# Patient Record
Sex: FEMALE | Race: WHITE | URBAN - METROPOLITAN AREA
[De-identification: names, ages, dates, MRNs, and addresses within clinical notes are randomized per-mention and may not be internally consistent; named-entity substitution may affect disease eponyms.]

---
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
## 2020-06-03 ENCOUNTER — TELEPHONE (OUTPATIENT)
Dept: FAMILY MEDICINE CLINIC | Facility: CLINIC | Age: 20
End: 2020-06-03

## 2020-06-03 RX ORDER — ETONOGESTREL AND ETHINYL ESTRADIOL VAGINAL .015; .12 MG/D; MG/D
RING VAGINAL
Qty: 3 EACH | Refills: 1 | OUTPATIENT
Start: 2020-06-03

## 2020-06-03 NOTE — TELEPHONE ENCOUNTER
Patient called and advised she was seeing a Psychologist, however her student insurance is no longer cover the visits with the psychologist. She was looking for a referral to a new psychologist.       Medication Quantity Refills Start End   FLUoxetine HCl

## 2020-06-03 NOTE — TELEPHONE ENCOUNTER
Patient called and asked if Dr. Eloina Goyal would refill this medication for her until she finds a new psychologist that is covered under her new insurance? Patient advised she is completely out of her last refill       Please Advise.        Please Use Pharm[id number]    Medication Needed:  Medication Quantity Refills Start End   FLUoxetine HCl 40 MG Oral Cap

## 2020-06-04 NOTE — TELEPHONE ENCOUNTER
Left message, please inform patient of Dr. Mia Funk message,    [address], PsyD  [facility], [address] [place] [address] Trini Mendoza  [phone]

## 2020-06-08 RX ORDER — ETONOGESTREL AND ETHINYL ESTRADIOL VAGINAL .015; .12 MG/D; MG/D
RING VAGINAL
Refills: 0 | OUTPATIENT
Start: 2020-06-08

## 2020-06-09 NOTE — TELEPHONE ENCOUNTER
From  Kae Rodriguez To  Azeem Stanley and Delivered  6/5/2020  9:36 AM   Last Read in MyChart  6/5/2020 12:37 PM by Finesse Marie

## 2020-07-08 ENCOUNTER — NURSE ONLY (OUTPATIENT)
Dept: FAMILY MEDICINE CLINIC | Facility: CLINIC | Age: 20
End: 2020-07-08
Payer: COMMERCIAL

## 2020-07-08 ENCOUNTER — OFFICE VISIT (OUTPATIENT)
Dept: FAMILY MEDICINE CLINIC | Facility: CLINIC | Age: 20
End: 2020-07-08
Payer: COMMERCIAL

## 2020-07-08 VITALS
WEIGHT: 126 LBS | DIASTOLIC BLOOD PRESSURE: 72 MMHG | TEMPERATURE: 99 F | SYSTOLIC BLOOD PRESSURE: 102 MMHG | BODY MASS INDEX: 22.61 KG/M2 | HEART RATE: 78 BPM | HEIGHT: 62.5 IN

## 2020-07-08 DIAGNOSIS — Z00.00 ANNUAL PHYSICAL EXAM: Primary | ICD-10-CM

## 2020-07-08 DIAGNOSIS — F32.A ANXIETY AND DEPRESSION: ICD-10-CM

## 2020-07-08 DIAGNOSIS — F41.9 ANXIETY AND DEPRESSION: ICD-10-CM

## 2020-07-08 PROCEDURE — 99395 PREV VISIT EST AGE 18-39: CPT | Performed by: FAMILY MEDICINE

## 2020-07-08 RX ORDER — ETONOGESTREL AND ETHINYL ESTRADIOL 11.7; 2.7 MG/1; MG/1
INSERT, EXTENDED RELEASE VAGINAL
Qty: 3 EACH | Refills: 3 | Status: SHIPPED | OUTPATIENT
Start: 2020-07-08 | End: 2021-01-05

## 2020-07-08 RX ORDER — FLUOXETINE HYDROCHLORIDE 40 MG/1
40 CAPSULE ORAL DAILY
Qty: 90 CAPSULE | Refills: 1 | Status: SHIPPED | OUTPATIENT
Start: 2020-07-08 | End: 2021-01-21

## 2020-07-08 RX ORDER — ESCITALOPRAM OXALATE 10 MG/1
10 TABLET ORAL DAILY
Qty: 90 TABLET | Refills: 1 | Status: SHIPPED | OUTPATIENT
Start: 2020-07-08 | End: 2021-06-15

## 2020-07-08 NOTE — PROGRESS NOTES
HPI:    Trent Rodriguez is a 21year old female presents to clinic for annual physical exam.  No acute concerns. Depression/anxiety-chronic issue for patient. Takes Prozac and Lexapro. Ran out 1 month back, reports feeling an increase in symptoms.   D are normal.   Eyes: Pupils are equal, round, and reactive to light. Conjunctivae and EOM are normal.   Neck: Normal range of motion. Neck supple. No thyromegaly present. Cardiovascular: Normal rate, regular rhythm and normal heart sounds.    No murmur hea content may be related to improper recognition by the system; efforts to review and correct have been done but errors may still exist. Please contact me with any questions.        7/8/2020  Stevan Flores MD

## 2020-07-09 LAB
CHLAMYDIA TRACHOMATIS$RNA, TMA: NOT DETECTED
NEISSERIA GONORRHOEAE$RNA, TMA: NOT DETECTED

## 2020-08-04 ENCOUNTER — TELEPHONE (OUTPATIENT)
Dept: FAMILY MEDICINE CLINIC | Facility: CLINIC | Age: 20
End: 2020-08-04

## 2020-08-04 NOTE — TELEPHONE ENCOUNTER
Patient requesting a letter excusing her from living on campus due to covid. States she does not want to risk any chances.

## 2020-09-27 ENCOUNTER — PATIENT MESSAGE (OUTPATIENT)
Dept: FAMILY MEDICINE CLINIC | Facility: CLINIC | Age: 20
End: 2020-09-27

## 2020-09-27 ENCOUNTER — TELEPHONE (OUTPATIENT)
Dept: FAMILY MEDICINE CLINIC | Facility: CLINIC | Age: 20
End: 2020-09-27

## 2020-09-28 NOTE — TELEPHONE ENCOUNTER
From: Sunshine Sandoval  To: Mounika Up MD  Sent: 9/27/2020 5:21 PM CDT  Subject: Prescription Question    Dear Dr. Jackelyn Salomon,  Is it possible to increase my dosage of Prozac?  Given everything going on in the world, I have been experiencing higher depres

## 2020-09-28 NOTE — TELEPHONE ENCOUNTER
From: Otilio Meigs Cozette  To: Dio Ramirez MD  Sent: 9/27/2020  5:21 PM CDT  Subject: Prescription Question    Dear Dr. Dionisio Dewitt,  Is it possible to increase my dosage of Prozac?  Given everything going on in the world, I have been experiencing higher depre

## 2020-09-30 NOTE — TELEPHONE ENCOUNTER
Left message to call back 3rd attempt, she has not read her my chart message. Sending a no response letter.

## 2021-01-05 RX ORDER — ETONOGESTREL AND ETHINYL ESTRADIOL 11.7; 2.7 MG/1; MG/1
INSERT, EXTENDED RELEASE VAGINAL
Qty: 3 EACH | Refills: 3 | Status: SHIPPED | OUTPATIENT
Start: 2021-01-05 | End: 2021-05-28

## 2021-01-21 RX ORDER — FLUOXETINE HYDROCHLORIDE 40 MG/1
CAPSULE ORAL
Qty: 90 CAPSULE | Refills: 1 | Status: SHIPPED | OUTPATIENT
Start: 2021-01-21 | End: 2021-08-06

## 2021-05-29 RX ORDER — ETONOGESTREL AND ETHINYL ESTRADIOL VAGINAL .015; .12 MG/D; MG/D
RING VAGINAL
Qty: 3 EACH | Refills: 3 | Status: SHIPPED | OUTPATIENT
Start: 2021-05-29 | End: 2021-08-06

## 2021-06-15 ENCOUNTER — OFFICE VISIT (OUTPATIENT)
Dept: FAMILY MEDICINE CLINIC | Facility: CLINIC | Age: 21
End: 2021-06-15
Payer: COMMERCIAL

## 2021-06-15 ENCOUNTER — LAB ENCOUNTER (OUTPATIENT)
Dept: LAB | Age: 21
End: 2021-06-15
Attending: FAMILY MEDICINE

## 2021-06-15 VITALS
TEMPERATURE: 98 F | HEART RATE: 83 BPM | BODY MASS INDEX: 23.73 KG/M2 | SYSTOLIC BLOOD PRESSURE: 93 MMHG | DIASTOLIC BLOOD PRESSURE: 64 MMHG | WEIGHT: 132.25 LBS | HEIGHT: 62.5 IN

## 2021-06-15 DIAGNOSIS — Z01.419 WELL WOMAN EXAM WITH ROUTINE GYNECOLOGICAL EXAM: Primary | ICD-10-CM

## 2021-06-15 DIAGNOSIS — F41.9 ANXIETY AND DEPRESSION: ICD-10-CM

## 2021-06-15 DIAGNOSIS — F32.A ANXIETY AND DEPRESSION: ICD-10-CM

## 2021-06-15 DIAGNOSIS — Z01.419 WELL WOMAN EXAM WITH ROUTINE GYNECOLOGICAL EXAM: ICD-10-CM

## 2021-06-15 PROCEDURE — 3078F DIAST BP <80 MM HG: CPT | Performed by: FAMILY MEDICINE

## 2021-06-15 PROCEDURE — 90471 IMMUNIZATION ADMIN: CPT | Performed by: FAMILY MEDICINE

## 2021-06-15 PROCEDURE — 36415 COLL VENOUS BLD VENIPUNCTURE: CPT

## 2021-06-15 PROCEDURE — 87389 HIV-1 AG W/HIV-1&-2 AB AG IA: CPT

## 2021-06-15 PROCEDURE — 90715 TDAP VACCINE 7 YRS/> IM: CPT | Performed by: FAMILY MEDICINE

## 2021-06-15 PROCEDURE — 99395 PREV VISIT EST AGE 18-39: CPT | Performed by: FAMILY MEDICINE

## 2021-06-15 PROCEDURE — 86780 TREPONEMA PALLIDUM: CPT

## 2021-06-15 PROCEDURE — 3008F BODY MASS INDEX DOCD: CPT | Performed by: FAMILY MEDICINE

## 2021-06-15 PROCEDURE — 3074F SYST BP LT 130 MM HG: CPT | Performed by: FAMILY MEDICINE

## 2021-06-15 RX ORDER — ALPRAZOLAM 1 MG/1
1 TABLET ORAL
COMMUNITY

## 2021-06-15 RX ORDER — MINOCYCLINE HYDROCHLORIDE 100 MG/1
CAPSULE ORAL
COMMUNITY
Start: 2021-06-01

## 2021-06-15 RX ORDER — SPIRONOLACTONE 50 MG/1
TABLET, FILM COATED ORAL
COMMUNITY
Start: 2021-06-01

## 2021-06-15 NOTE — PROGRESS NOTES
HPI:    Elias Boone is a 24year old female presents to clinic for annual physical exam.  No acute concerns. Recently started minocycline and spironolactone for acne, feels it is helping. Normal appetite, tries eat healthy foods.   Normal bowel mov light.   Neck:      Thyroid: No thyromegaly. Cardiovascular:      Rate and Rhythm: Normal rate and regular rhythm. Heart sounds: Normal heart sounds. No murmur heard. Pulmonary:      Effort: Pulmonary effort is normal. No respiratory distress. YEARS, IM USE      ThinPrep PAP with HPV Reflex Request [E]      Chlamydia/Gc Amplification [E]      Meds This Visit:  Requested Prescriptions      No prescriptions requested or ordered in this encounter       Imaging & Referrals:  TETANUS, DIPHTHERIA TOXO

## 2021-06-16 LAB — T PALLIDUM AB SER QL: NEGATIVE

## 2021-08-06 RX ORDER — FLUOXETINE HYDROCHLORIDE 40 MG/1
40 CAPSULE ORAL DAILY
Qty: 90 CAPSULE | Refills: 1 | Status: SHIPPED | OUTPATIENT
Start: 2021-08-06 | End: 2021-12-12

## 2021-08-06 RX ORDER — ETONOGESTREL AND ETHINYL ESTRADIOL VAGINAL .015; .12 MG/D; MG/D
RING VAGINAL
Qty: 3 EACH | Refills: 1 | Status: SHIPPED | OUTPATIENT
Start: 2021-08-06 | End: 2022-03-01

## 2021-08-07 NOTE — TELEPHONE ENCOUNTER
Last Pap Smear was 6/15/21. Refill passed per [facility] protocol. Requested Prescriptions   Pending Prescriptions Disp Refills    FLUoxetine HCl 40 MG Oral Cap 90 capsule 1     Sig: Take 1 capsule (40 mg total) by mouth daily.         Psychiatric Non-Scheduled (Anti-Anxiety) Passed - 8/6/2021 10:59 PM        Passed - Appointment in last 6 or next 3 months           Etonogestrel-Ethinyl Estradiol 0.12-0.015 MG/24HR Vaginal Ring 3 each 3     Sig: Use as directed        Gynecology Medication Protocol Failed - 8/6/2021 10:59 PM        Failed - Pass dependent on manual look-up of last PAP and patient compliance with PAP follow up recommendations        Passed - Appointment in past 12 or next 3 months                    Recent Outpatient Visits              1 month ago Well woman exam with routine gynecological exam    Krystal Arteaga MD    Office Visit    1 year ago     [facility], [address]    Nurse Only    1 year ago Annual physical exam    Krystal Arteaga MD    Office Visit    2 years ago Encounter for contraceptive management, unspecified type    Krystal Arteaga MD    Office Visit    3 years ago Encounter for general counseling and advice on contraceptive [employer], Charles Stubbs MD    Office Visit

## 2021-08-11 ENCOUNTER — NURSE TRIAGE (OUTPATIENT)
Dept: FAMILY MEDICINE CLINIC | Facility: CLINIC | Age: 21
End: 2021-08-11

## 2021-08-11 NOTE — TELEPHONE ENCOUNTER
Action Requested: Summary for Provider     []  Critical Lab, Recommendations Needed  [] Need Additional Advice  [x]   FYI    []   Need Orders  [] Need Medications Sent to Pharmacy  []  Other     SUMMARY: Per protocol: OV. OPO is full.  Offered other offices

## 2021-08-11 NOTE — TELEPHONE ENCOUNTER
----- Message from [facility] sent at 8/11/2021 11:02 AM CDT -----  Regarding: Non-Urgent Medical Question  Contact: [phone]  I think my birth control (NuvaRing) may have started giving me mild but consistent yeast infections.  I'm not sure if th

## 2021-12-12 RX ORDER — FLUOXETINE HYDROCHLORIDE 40 MG/1
CAPSULE ORAL
Qty: 90 CAPSULE | Refills: 1 | Status: SHIPPED | OUTPATIENT
Start: 2021-12-12

## 2021-12-22 ENCOUNTER — LAB ENCOUNTER (OUTPATIENT)
Dept: LAB | Age: 21
End: 2021-12-22
Attending: FAMILY MEDICINE
Payer: COMMERCIAL

## 2021-12-22 ENCOUNTER — OFFICE VISIT (OUTPATIENT)
Dept: FAMILY MEDICINE CLINIC | Facility: CLINIC | Age: 21
End: 2021-12-22
Payer: COMMERCIAL

## 2021-12-22 VITALS
TEMPERATURE: 97 F | HEIGHT: 63 IN | OXYGEN SATURATION: 98 % | BODY MASS INDEX: 21.79 KG/M2 | DIASTOLIC BLOOD PRESSURE: 68 MMHG | WEIGHT: 123 LBS | SYSTOLIC BLOOD PRESSURE: 102 MMHG | RESPIRATION RATE: 18 BRPM | HEART RATE: 85 BPM

## 2021-12-22 DIAGNOSIS — F32.1 CURRENT MODERATE EPISODE OF MAJOR DEPRESSIVE DISORDER, UNSPECIFIED WHETHER RECURRENT (HCC): ICD-10-CM

## 2021-12-22 DIAGNOSIS — R63.0 ANOREXIA: Primary | ICD-10-CM

## 2021-12-22 PROCEDURE — 3074F SYST BP LT 130 MM HG: CPT | Performed by: FAMILY MEDICINE

## 2021-12-22 PROCEDURE — 99214 OFFICE O/P EST MOD 30 MIN: CPT | Performed by: FAMILY MEDICINE

## 2021-12-22 PROCEDURE — 3008F BODY MASS INDEX DOCD: CPT | Performed by: FAMILY MEDICINE

## 2021-12-22 PROCEDURE — 3078F DIAST BP <80 MM HG: CPT | Performed by: FAMILY MEDICINE

## 2021-12-22 NOTE — PROGRESS NOTES
HPI:    Mel Torres is a 24year old female with history of major depression disorder anxiety presents for follow-up. Seeing a therapist and a dietitian. Feels she has good/bad days.   Frequently restricts herself from eating, still struggles with disorder, unspecified whether recurrent ([facility].)  Plan: CBC WITH DIFFERENTIAL WITH PLATELET, TSH W         REFLEX TO FREE T4, IRON AND TIBC, VITAMIN D,         25-HYDROXY, VITAMIN B12  -CBC, vitamin D/B12, iron, TSH levels drawn.   Patient will sign release so

## 2021-12-29 ENCOUNTER — TELEPHONE (OUTPATIENT)
Dept: FAMILY MEDICINE CLINIC | Facility: CLINIC | Age: 21
End: 2021-12-29

## 2021-12-29 NOTE — TELEPHONE ENCOUNTER
Patient's dietician- Char Campo is calling to request results from labs taken on 12/22 and patient's updated weight. Char Campo is requesting either a call or a fax with the information before Monday.   Fax: [phone]

## 2022-03-01 ENCOUNTER — PATIENT MESSAGE (OUTPATIENT)
Dept: FAMILY MEDICINE CLINIC | Facility: CLINIC | Age: 22
End: 2022-03-01

## 2022-03-01 NOTE — TELEPHONE ENCOUNTER
From: Skinny Sandoval  To: Zac Saldivar MD  Sent: 3/1/2022 2:05 PM CST  Subject: Upcoming Visit    I forgot to mention I would also like to get my Xanax refilled, it has been a while since I have been able to refill and I have needed it.

## 2022-03-02 RX ORDER — ETONOGESTREL AND ETHINYL ESTRADIOL 11.7; 2.7 MG/1; MG/1
INSERT, EXTENDED RELEASE VAGINAL
Qty: 3 EACH | Refills: 1 | Status: SHIPPED | OUTPATIENT
Start: 2022-03-02

## 2022-03-02 RX ORDER — ETONOGESTREL AND ETHINYL ESTRADIOL 11.7; 2.7 MG/1; MG/1
INSERT, EXTENDED RELEASE VAGINAL
Qty: 3 EACH | Refills: 1 | Status: SHIPPED | OUTPATIENT
Start: 2022-03-02 | End: 2022-03-02

## 2022-03-02 RX ORDER — ALPRAZOLAM 1 MG/1
1 TABLET ORAL NIGHTLY PRN
Qty: 90 TABLET | Refills: 0 | Status: SHIPPED | OUTPATIENT
Start: 2022-03-02 | End: 2022-03-15

## 2022-03-03 NOTE — TELEPHONE ENCOUNTER
Refill passed per [facility] protocol. Requested Prescriptions   Pending Prescriptions Disp Refills    Etonogestrel-Ethinyl Estradiol 0.12-0.015 MG/24HR Vaginal Ring 3 each 1     Sig: Use as directed        Gynecology Medication Protocol Failed - 3/1/2022  2:04 PM        Failed - Pass dependent on manual look-up of last PAP and patient compliance with PAP follow up recommendations        Passed - Appointment in past 12 or next 3 months             Recent Outpatient Visits              2 months ago [address] [place] Clyda Albe, MD    Office Visit    8 months ago Well woman exam with routine gynecological exam    [place] [employer], Sheryle [place], [place]    Office Visit    1 year ago     [place] [facility] [place]    Nurse Only    1 year ago Annual physical exam    Roger De La Rosa MD    Office Visit    2 years ago Encounter for contraceptive management, unspecified type    [place] [facility] [place] Clyda Albe[facility] [place]    Office Visit          Future Appointments         Provider Department Appt Notes    In 1 week Terri Shell MD [place] [facility] [place] I am looking to increase my dosage of Prozac.

## 2022-03-15 PROBLEM — F50.9 EATING DISORDER, UNSPECIFIED: Status: ACTIVE | Noted: 2022-03-15

## 2022-03-15 PROBLEM — F33.9 MAJOR DEPRESSION, RECURRENT (HCC): Status: ACTIVE | Noted: 2022-03-15

## 2022-03-15 PROBLEM — F41.1 GAD (GENERALIZED ANXIETY DISORDER): Status: ACTIVE | Noted: 2022-03-15

## 2022-06-27 ENCOUNTER — TELEPHONE (OUTPATIENT)
Dept: FAMILY MEDICINE CLINIC | Facility: CLINIC | Age: 22
End: 2022-06-27

## 2022-06-27 RX ORDER — ETONOGESTREL AND ETHINYL ESTRADIOL 11.7; 2.7 MG/1; MG/1
1 INSERT, EXTENDED RELEASE VAGINAL
Qty: 3 EACH | Refills: 0 | Status: SHIPPED | OUTPATIENT
Start: 2022-06-27

## 2022-06-27 NOTE — TELEPHONE ENCOUNTER
Per pharmacy they need to clarify the direction on Nuvering that they received Please call [phone].

## 2022-08-15 ENCOUNTER — TELEPHONE (OUTPATIENT)
Dept: FAMILY MEDICINE CLINIC | Facility: CLINIC | Age: 22
End: 2022-08-15

## 2022-08-15 NOTE — TELEPHONE ENCOUNTER
RECORDS RECEIVED FROM: internal    DATE RECEIVED: 12.14.21    NOTES (FOR ALL VISITS) STATUS DETAILS   OFFICE NOTES from referring provider internal  Dr. Haskins   OFFICE NOTES from other specialist na    ED NOTES na    OPERATIVE REPORT  (thyroid, pituitary, adrenal, parathyroid) na    MEDICATION LIST internal     IMAGING      DEXASCAN na    MRI (BRAIN) na    XR (Chest) na    CT (HEAD/NECK/CHEST/ABDOMEN) na    NUCLEAR  na    ULTRASOUND (HEAD/NECK) na    LABS     DIABETES: HBGA1C, CREATININE, FASTING LIPIDS, MICROALBUMIN URINE, POTASSIUM, TSH, T4    THYROID: TSH, T4, CBC, THYRODLONULIN, TOTAL T3, FREE T4, CALCITONIN, CEA internal  T3- 10.26.21   TSH/T4- 10.26.21   HBGA1C- 10.26.21   Cbc- 10.26.21  Creatinine- 5.4.21               Per José Miguel, Fluoxetine 60mg Tablets is not covered under patient's insurance. Per Rx benefit plan, alternative medications include Fluoxetine 20mg and 40mg Capsules to equal 60mg dose. Please fax back with approval along with strength, directions, quantity and refills.

## 2022-08-15 NOTE — TELEPHONE ENCOUNTER
Called patient's pharmacy, confirmed name and . Spoke to [place] PharmD and confirmed script can be filled as 20mg with 40mg to achieve prescribed dose.

## 2022-08-23 RX ORDER — FLUOXETINE HYDROCHLORIDE 40 MG/1
40 CAPSULE ORAL DAILY
Qty: 90 CAPSULE | Refills: 1 | Status: SHIPPED | OUTPATIENT
Start: 2022-08-23

## 2022-08-23 NOTE — TELEPHONE ENCOUNTER
Refill passed per [employer] [facility] protocol. Requested Prescriptions   Pending Prescriptions Disp Refills    FLUoxetine HCl 40 MG Oral Cap 90 capsule 1     Sig: Take 1 capsule (40 mg total) by mouth daily.  Take with 20mg to total 60mg        Psychiatric Non-Scheduled (Anti-Anxiety) Passed - 8/23/2022  3:00 PM        Passed - In person appointment or virtual visit in the past 6 mos or appointment in next 3 mos       Recent Outpatient Visits              5 months ago Moderate episode of recurrent major depressive disorder [facility])    [place] [employer] [facility], [address] MD    Office Visit    [facility]    Office Visit    1 year ago Well woman exam with routine gynecological exam    [place] [facility], Earl Truong MD    Office Visit    2 years ago     [place] [facility], [address]    Nurse Only    2 years ago Annual physical exam    [facility], [facility], [place], Cherylyn Pauls, MD    Office Visit     Future Appointments         Provider Department Appt Notes    In 1 month Malia Flanagan, [employer] [employer] [facility], [address] 6/15/21                      Recent Outpatient Visits              5 months ago Moderate episode of recurrent major depressive disorder [facility])    Irineo Go MD    Office Visit    8 months ago Denita Oliveira MD    Office Visit    1 year ago Well woman exam with routine gynecological exam    Irineo Go MD    Office Visit    2 years ago     [place] [employer] [facility], [address]    Nurse Only    2 years ago Annual physical exam    [place] [employer] [facility], [place] Truong MD    Office Visit             Future Appointments         Provider Department Appt Notes    In 1 month Merryl Masha, [address] [facility], [address], [place] Last [address] 6/15/21

## 2022-10-04 ENCOUNTER — LAB ENCOUNTER (OUTPATIENT)
Dept: LAB | Age: 22
End: 2022-10-04
Attending: FAMILY MEDICINE
Payer: COMMERCIAL

## 2022-10-04 ENCOUNTER — OFFICE VISIT (OUTPATIENT)
Dept: FAMILY MEDICINE CLINIC | Facility: CLINIC | Age: 22
End: 2022-10-04
Payer: COMMERCIAL

## 2022-10-04 VITALS
HEIGHT: 63 IN | SYSTOLIC BLOOD PRESSURE: 100 MMHG | BODY MASS INDEX: 22.68 KG/M2 | HEART RATE: 70 BPM | RESPIRATION RATE: 17 BRPM | OXYGEN SATURATION: 99 % | DIASTOLIC BLOOD PRESSURE: 64 MMHG | WEIGHT: 128 LBS

## 2022-10-04 DIAGNOSIS — Z11.3 SCREENING EXAMINATION FOR STD (SEXUALLY TRANSMITTED DISEASE): ICD-10-CM

## 2022-10-04 DIAGNOSIS — Z00.00 ANNUAL PHYSICAL EXAM: Primary | ICD-10-CM

## 2022-10-04 PROCEDURE — 3074F SYST BP LT 130 MM HG: CPT | Performed by: FAMILY MEDICINE

## 2022-10-04 PROCEDURE — 90471 IMMUNIZATION ADMIN: CPT | Performed by: FAMILY MEDICINE

## 2022-10-04 PROCEDURE — 99395 PREV VISIT EST AGE 18-39: CPT | Performed by: FAMILY MEDICINE

## 2022-10-04 PROCEDURE — 3078F DIAST BP <80 MM HG: CPT | Performed by: FAMILY MEDICINE

## 2022-10-04 PROCEDURE — 90686 IIV4 VACC NO PRSV 0.5 ML IM: CPT | Performed by: FAMILY MEDICINE

## 2022-10-04 PROCEDURE — 3008F BODY MASS INDEX DOCD: CPT | Performed by: FAMILY MEDICINE

## 2022-10-04 RX ORDER — MINOCYCLINE HYDROCHLORIDE 50 MG/1
TABLET ORAL
COMMUNITY
Start: 2022-10-03

## 2022-10-05 LAB
CHLAMYDIA TRACHOMATIS$RNA, TMA: NOT DETECTED
NEISSERIA GONORRHOEAE$RNA, TMA: NOT DETECTED

## 2022-12-22 ENCOUNTER — OFFICE VISIT (OUTPATIENT)
Dept: FAMILY MEDICINE CLINIC | Facility: CLINIC | Age: 22
End: 2022-12-22
Payer: COMMERCIAL

## 2022-12-22 ENCOUNTER — LAB ENCOUNTER (OUTPATIENT)
Dept: LAB | Age: 22
End: 2022-12-22
Attending: FAMILY MEDICINE
Payer: COMMERCIAL

## 2022-12-22 VITALS
DIASTOLIC BLOOD PRESSURE: 72 MMHG | HEIGHT: 63 IN | WEIGHT: 127 LBS | OXYGEN SATURATION: 98 % | HEART RATE: 70 BPM | SYSTOLIC BLOOD PRESSURE: 108 MMHG | BODY MASS INDEX: 22.5 KG/M2 | RESPIRATION RATE: 19 BRPM

## 2022-12-22 DIAGNOSIS — Z30.09 GENERAL COUNSELING AND ADVICE FOR CONTRACEPTIVE MANAGEMENT: ICD-10-CM

## 2022-12-22 DIAGNOSIS — Z11.3 SCREENING EXAMINATION FOR STD (SEXUALLY TRANSMITTED DISEASE): ICD-10-CM

## 2022-12-22 DIAGNOSIS — N76.0 ACUTE VAGINITIS: Primary | ICD-10-CM

## 2022-12-22 PROCEDURE — 3008F BODY MASS INDEX DOCD: CPT | Performed by: FAMILY MEDICINE

## 2022-12-22 PROCEDURE — 3078F DIAST BP <80 MM HG: CPT | Performed by: FAMILY MEDICINE

## 2022-12-22 PROCEDURE — 99214 OFFICE O/P EST MOD 30 MIN: CPT | Performed by: FAMILY MEDICINE

## 2022-12-22 PROCEDURE — 3074F SYST BP LT 130 MM HG: CPT | Performed by: FAMILY MEDICINE

## 2022-12-23 LAB
CANDIDA GLABRATA: NOT DETECTED
CANDIDA SPECIES: DETECTED
CHLAMYDIA TRACHOMATIS$RNA, TMA: NOT DETECTED
NEISSERIA GONORRHOEAE$RNA, TMA: NOT DETECTED
SURESWAB(R) ADV BACTERIAL VAGINOSIS (BV), TMA: NEGATIVE
TRICHOMONAS VAGINALIS (TV): NOT DETECTED

## 2023-09-22 NOTE — TELEPHONE ENCOUNTER
Routed to [address] for advise, thanks. LOV 10-4-22      Please review refill protocol failed/ no protocol  Requested Prescriptions   Pending Prescriptions Disp Refills    fluconazole 150 MG Oral Tab 2 tablet 0     Sig: To take one tablet and repeat dose in 3 days if no improvement.        There is no refill protocol information for this order

## 2023-09-25 RX ORDER — FLUCONAZOLE 150 MG/1
TABLET ORAL
Qty: 2 TABLET | Refills: 0 | Status: SHIPPED | OUTPATIENT
Start: 2023-09-25

## 2023-10-06 ENCOUNTER — OFFICE VISIT (OUTPATIENT)
Dept: FAMILY MEDICINE CLINIC | Facility: CLINIC | Age: 23
End: 2023-10-06

## 2023-10-06 VITALS
DIASTOLIC BLOOD PRESSURE: 54 MMHG | SYSTOLIC BLOOD PRESSURE: 86 MMHG | BODY MASS INDEX: 21 KG/M2 | TEMPERATURE: 98 F | HEART RATE: 83 BPM | WEIGHT: 117 LBS

## 2023-10-06 DIAGNOSIS — N92.1 BREAKTHROUGH BLEEDING ON BIRTH CONTROL PILLS: Primary | ICD-10-CM

## 2023-10-06 PROCEDURE — 99213 OFFICE O/P EST LOW 20 MIN: CPT | Performed by: FAMILY MEDICINE

## 2023-10-06 PROCEDURE — 3074F SYST BP LT 130 MM HG: CPT | Performed by: FAMILY MEDICINE

## 2023-10-06 PROCEDURE — 90686 IIV4 VACC NO PRSV 0.5 ML IM: CPT | Performed by: FAMILY MEDICINE

## 2023-10-06 PROCEDURE — 3078F DIAST BP <80 MM HG: CPT | Performed by: FAMILY MEDICINE

## 2023-10-06 PROCEDURE — 90471 IMMUNIZATION ADMIN: CPT | Performed by: FAMILY MEDICINE

## 2023-10-06 RX ORDER — FLUCONAZOLE 150 MG/1
150 TABLET ORAL
Qty: 6 TABLET | Refills: 0 | Status: SHIPPED | OUTPATIENT
Start: 2023-10-06 | End: 2023-12-29

## 2023-10-06 RX ORDER — NORGESTIMATE AND ETHINYL ESTRADIOL 0.25-0.035
1 KIT ORAL DAILY
Qty: 84 TABLET | Refills: 3 | Status: SHIPPED | OUTPATIENT
Start: 2023-10-06 | End: 2024-10-05

## 2023-10-06 RX ORDER — LAMOTRIGINE 100 MG/1
100 TABLET ORAL DAILY
COMMUNITY

## 2023-10-06 NOTE — PROGRESS NOTES
Subjective:   Patient ID: Macie Allen is a 21year old female. Menstrual Problem  The patient's primary symptoms include vaginal bleeding. This is a recurrent problem. The current episode started more than 1 month ago. The problem occurs intermittently. The problem has been waxing and waning. She is not pregnant. Pertinent negatives include no dysuria, fever or nausea. History/Other:   Review of Systems   Constitutional:  Negative for fever. Gastrointestinal:  Negative for nausea. Genitourinary:  Positive for menstrual problem. Negative for dysuria. Current Outpatient Medications   Medication Sig Dispense Refill    lamoTRIgine 100 MG Oral Tab Take 1 tablet (100 mg total) by mouth daily. fluconazole (DIFLUCAN) 150 MG Oral Tab Take 1 tablet (150 mg total) by mouth every 14 (fourteen) days. 6 tablet 0    Norgestimate-Eth Estradiol (SPRINTEC 28) 0.25-35 MG-MCG Oral Tab Take 1 tablet by mouth daily. 84 tablet 3    FLUoxetine HCl 40 MG Oral Cap Take 1 capsule (40 mg total) by mouth daily. Take with 20mg to total 60mg 90 capsule 1    ALPRAZolam 1 MG Oral Tab Take 1 tablet (1 mg total) by mouth nightly as needed. 10 tablet 0    spironolactone 50 MG Oral Tab        Allergies:No Known Allergies    Objective:   Physical Exam  Constitutional:       Appearance: Normal appearance. Cardiovascular:      Rate and Rhythm: Normal rate and regular rhythm. Pulses: Normal pulses. Heart sounds: Normal heart sounds. Pulmonary:      Effort: Pulmonary effort is normal.      Breath sounds: Normal breath sounds. Musculoskeletal:      Right lower leg: No edema. Left lower leg: No edema. Neurological:      Mental Status: She is alert. Assessment & Plan:   Breakthrough bleeding on birth control pills  (primary encounter diagnosis)  Patient experiencing breakthrough bleeding on current pills.   Had menses for 12 days 7/2023, then no menses 8/2023, then 2 episodes of bleeding in the past 3 weeks.  She is taking pills consistently. No nausea, breast tenderness. She is experiencing recurrent yeast infections. Plan to change to Sprintec as directed reviewed instructions and side effects. Recurrent yeast infection-discussed options of boric acid suppository, monitoring for recurrent symptoms, or Diflucan every 2 weeks for 3 months. Plan to proceed with Diflucan as directed reviewed instructions and side effects. Orders Placed This Encounter      Fluzone Quadrivalent 6mo+ 0.5mL      Meds This Visit:  Requested Prescriptions     Signed Prescriptions Disp Refills    fluconazole (DIFLUCAN) 150 MG Oral Tab 6 tablet 0     Sig: Take 1 tablet (150 mg total) by mouth every 14 (fourteen) days. Norgestimate-Eth Estradiol (SPRINTEC 28) 0.25-35 MG-MCG Oral Tab 84 tablet 3     Sig: Take 1 tablet by mouth daily.        Imaging & Referrals:  INFLUENZA VACCINE, QUAD, PRESERVATIVE FREE, 0.5 ML

## 2023-10-06 NOTE — PATIENT INSTRUCTIONS
Take last pill of current pack this Sunday. Take nothing for 1 week then start new birth control pill a week from this Sunday.

## 2023-10-16 ENCOUNTER — PATIENT MESSAGE (OUTPATIENT)
Dept: FAMILY MEDICINE CLINIC | Facility: CLINIC | Age: 23
End: 2023-10-16

## 2024-04-20 ENCOUNTER — TELEPHONE (OUTPATIENT)
Dept: FAMILY MEDICINE CLINIC | Facility: CLINIC | Age: 24
End: 2024-04-20

## 2024-04-22 RX ORDER — FLUCONAZOLE 150 MG/1
150 TABLET ORAL
Qty: 6 TABLET | Refills: 0 | Status: CANCELLED | OUTPATIENT
Start: 2024-04-22 | End: 2024-07-15

## 2024-04-22 RX ORDER — ALPRAZOLAM 1 MG/1
1 TABLET ORAL NIGHTLY PRN
Qty: 10 TABLET | Refills: 0 | OUTPATIENT
Start: 2024-04-22

## 2024-04-22 NOTE — TELEPHONE ENCOUNTER
Please review. Protocol Failed; No Protocol      Recent Visits  Date Type Provider Dept   10/06/23 Office Visit Fara Ledbetter MD [facility]   12/22/22 Office Visit Roger Humphrey MD [facility]   Showing recent visits within past 540 days with a meds authorizing provider and meeting all other requirements  Future Appointments  No visits were found meeting these conditions.  Showing future appointments within next 150 days with a meds authorizing provider and meeting all other requirements        Requested Prescriptions   Pending Prescriptions Disp Refills    ALPRAZolam 1 MG Oral Tab 10 tablet 0     Sig: Take 1 tablet (1 mg total) by mouth nightly as needed.       Controlled Substance Medication Failed - 4/19/2024  2:42 PM        Failed - This medication is a controlled substance - forward to provider to refill                 Recent Outpatient Visits              6 months ago Breakthrough bleeding on birth control pills    [facility] Fara Ledbetter MD    Office Visit    1 year ago Acute vaginitis    [facility] Roger Bedoya MD    Office Visit    1 year ago [facility] physical [facility]    Office Visit    2 years ago Moderate episode of recurrent major depressive disorder (HCC)    [facility] Roger Bedoya MD    Office Visit    2 years ago Anorexia    [facility]    Office Visit

## 2024-04-22 NOTE — TELEPHONE ENCOUNTER
Left message to call back and mychart message sent.    Last read by Larisa Sandoval at  2:59 PM on 4/22/2024.

## 2024-04-26 ENCOUNTER — TELEPHONE (OUTPATIENT)
Dept: FAMILY MEDICINE CLINIC | Facility: CLINIC | Age: 24
End: 2024-04-26

## 2024-04-26 RX ORDER — FLUCONAZOLE 150 MG/1
150 TABLET ORAL
Qty: 6 TABLET | Refills: 1 | Status: SHIPPED | OUTPATIENT
Start: 2024-04-26

## 2024-04-26 NOTE — TELEPHONE ENCOUNTER
Patient contacted. Verified name and date of birth.  Informed of Prescription sent to pharmacy.  Verbalized understanding.

## 2024-04-26 NOTE — TELEPHONE ENCOUNTER
Spoke with pt,  verified, pt is request rx ref for diflucan.   Pt was seen by Dr Ledbetter on 10-6-23 for yeast infect, she was advised at that time to take diflucan every 2 weeks for 3 months, see office notes.  Last refilled was on 10-6-23 for 6 pills.  Pt is requesting medication refill in case she has flare up, she is concerned that she doesn't get the refill on time.   Pt denies any symptom of yeast infection at this time.   pls advise, thanks in advance.          Assessment & Plan:  Breakthrough bleeding on birth control pills  (primary encounter diagnosis)  Patient experiencing breakthrough bleeding on current pills.  Had menses for 12 days 2023, then no menses 2023, then 2 episodes of bleeding in the past 3 weeks.  She is taking pills consistently.  No nausea, breast tenderness.  She is experiencing recurrent yeast infections.  Plan to change to Sprintec as directed reviewed instructions and side effects.     Recurrent yeast infection-discussed options of boric acid suppository, monitoring for recurrent symptoms, or Diflucan every 2 weeks for 3 months.  Plan to proceed with Diflucan as directed reviewed instructions and side effects.

## 2024-10-18 NOTE — TELEPHONE ENCOUNTER
Patient requesting refill , said pharmacy requested a couple of weeks ago.     [id number] [place], [place] - [address] [place] [address],       Medication Detail    Medication Quantity Refills Start End   Norgestimate-Eth Estradiol (SPRINTEC 28) 0.25-35 MG-MCG Oral Tab 84 tablet 3 10/6/2023 10/5/2024   Sig:   Take 1 tablet by mouth daily.     Route:   Oral     Order #:   [id number]

## 2024-10-18 NOTE — TELEPHONE ENCOUNTER
Pap 2021-Neg, no Annual appt   Please review.Protocol failed/ No protocol      Requested Prescriptions   Pending Prescriptions Disp Refills    Norgestimate-Eth Estradiol (SPRINTEC 28) 0.25-35 MG-MCG Oral Tab 84 tablet 3     Sig: Take 1 tablet by mouth daily.       Gynecology Medication Protocol Failed - 10/18/2024  5:38 PM        Failed - Physical or Pelvic/Breast in past 12 or next 3 mos--VIA MANUAL LOOKUP        Passed - PASS-PENDING LAST PAP WNL--VIA MANUAL LOOKUP                  Recent Outpatient Visits              1 year ago Breakthrough bleeding on birth control pills    [facility]    Office Visit    1 year ago Acute vaginitis    [facility]    Office Visit    2 years ago Annual physical exam    [facility] [place] Steve Siva, MD    Office Visit    2 years ago Moderate episode of recurrent major depressive disorder (HCC)    [facility]    Office Visit    2 years ago Anorexia    [facility]    Office Visit

## 2024-10-18 NOTE — TELEPHONE ENCOUNTER
Dear nursing staff,     Please review patient's chart for birth control/gyne medication refill request. Thank you    No recent refill encounter for this medication.    Requested Prescriptions     Pending Prescriptions Disp Refills    Norgestimate-Eth Estradiol (SPRINTEC 28) 0.25-35 MG-MCG Oral Tab 84 tablet 3     Sig: Take 1 tablet by mouth daily.

## 2024-11-07 ENCOUNTER — PATIENT MESSAGE (OUTPATIENT)
Dept: FAMILY MEDICINE CLINIC | Facility: CLINIC | Age: 24
End: 2024-11-07

## 2024-11-12 ENCOUNTER — LAB ENCOUNTER (OUTPATIENT)
Dept: LAB | Age: 24
End: 2024-11-12
Payer: COMMERCIAL

## 2024-11-12 ENCOUNTER — OFFICE VISIT (OUTPATIENT)
Dept: FAMILY MEDICINE CLINIC | Facility: CLINIC | Age: 24
End: 2024-11-12

## 2024-11-12 VITALS
WEIGHT: 116 LBS | SYSTOLIC BLOOD PRESSURE: 89 MMHG | RESPIRATION RATE: 16 BRPM | HEIGHT: 63 IN | OXYGEN SATURATION: 100 % | HEART RATE: 75 BPM | TEMPERATURE: 98 F | DIASTOLIC BLOOD PRESSURE: 56 MMHG | BODY MASS INDEX: 20.55 KG/M2

## 2024-11-12 DIAGNOSIS — F41.9 ANXIETY AND DEPRESSION: ICD-10-CM

## 2024-11-12 DIAGNOSIS — Z01.419 ENCOUNTER FOR GYNECOLOGICAL EXAMINATION: ICD-10-CM

## 2024-11-12 DIAGNOSIS — Z30.09 GENERAL COUNSELING AND ADVICE FOR CONTRACEPTIVE MANAGEMENT: ICD-10-CM

## 2024-11-12 DIAGNOSIS — F32.A ANXIETY AND DEPRESSION: ICD-10-CM

## 2024-11-12 DIAGNOSIS — Z00.00 ROUTINE PHYSICAL EXAMINATION: Primary | ICD-10-CM

## 2024-11-12 DIAGNOSIS — I95.9 HYPOTENSION, UNSPECIFIED HYPOTENSION TYPE: ICD-10-CM

## 2024-11-12 LAB
ALBUMIN SERPL-MCNC: 4.9 G/DL (ref 3.2–4.8)
ALBUMIN/GLOB SERPL: 2 {RATIO} (ref 1–2)
ALP LIVER SERPL-CCNC: 50 U/L
ALT SERPL-CCNC: 25 U/L
ANION GAP SERPL CALC-SCNC: 8 MMOL/L (ref 0–18)
AST SERPL-CCNC: 18 U/L (ref ?–34)
BASOPHILS # BLD AUTO: 0.04 X10(3) UL (ref 0–0.2)
BASOPHILS NFR BLD AUTO: 0.5 %
BILIRUB SERPL-MCNC: 0.6 MG/DL (ref 0.3–1.2)
BUN BLD-MCNC: 7 MG/DL (ref 9–23)
BUN/CREAT SERPL: 8.8 (ref 10–20)
CALCIUM BLD-MCNC: 10.5 MG/DL (ref 8.7–10.4)
CHLORIDE SERPL-SCNC: 107 MMOL/L (ref 98–112)
CHOLEST SERPL-MCNC: 185 MG/DL (ref ?–200)
CO2 SERPL-SCNC: 27 MMOL/L (ref 21–32)
CREAT BLD-MCNC: 0.8 MG/DL
CYTOLOGY CVX/VAG DOC THIN PREP: NORMAL
DEPRECATED HBV CORE AB SER IA-ACNC: 31 NG/ML
DEPRECATED RDW RBC AUTO: 41.5 FL (ref 35.1–46.3)
EGFRCR SERPLBLD CKD-EPI 2021: 105 ML/MIN/1.73M2 (ref 60–?)
EOSINOPHIL # BLD AUTO: 0.14 X10(3) UL (ref 0–0.7)
EOSINOPHIL NFR BLD AUTO: 1.9 %
ERYTHROCYTE [DISTWIDTH] IN BLOOD BY AUTOMATED COUNT: 12 % (ref 11–15)
FASTING PATIENT LIPID ANSWER: NO
FASTING STATUS PATIENT QL REPORTED: NO
GLOBULIN PLAS-MCNC: 2.5 G/DL (ref 2–3.5)
GLUCOSE BLD-MCNC: 84 MG/DL (ref 70–99)
HCT VFR BLD AUTO: 39.1 %
HDLC SERPL-MCNC: 57 MG/DL (ref 40–59)
HGB BLD-MCNC: 13.3 G/DL
IMM GRANULOCYTES # BLD AUTO: 0.01 X10(3) UL (ref 0–1)
IMM GRANULOCYTES NFR BLD: 0.1 %
IRON SATN MFR SERPL: 22 %
IRON SERPL-MCNC: 98 UG/DL
LDLC SERPL CALC-MCNC: 117 MG/DL (ref ?–100)
LYMPHOCYTES # BLD AUTO: 1.71 X10(3) UL (ref 1–4)
LYMPHOCYTES NFR BLD AUTO: 23 %
MCH RBC QN AUTO: 32 PG (ref 26–34)
MCHC RBC AUTO-ENTMCNC: 34 G/DL (ref 31–37)
MCV RBC AUTO: 94 FL
MONOCYTES # BLD AUTO: 0.53 X10(3) UL (ref 0.1–1)
MONOCYTES NFR BLD AUTO: 7.1 %
NEUTROPHILS # BLD AUTO: 5.01 X10 (3) UL (ref 1.5–7.7)
NEUTROPHILS # BLD AUTO: 5.01 X10(3) UL (ref 1.5–7.7)
NEUTROPHILS NFR BLD AUTO: 67.4 %
NONHDLC SERPL-MCNC: 128 MG/DL (ref ?–130)
OSMOLALITY SERPL CALC.SUM OF ELEC: 291 MOSM/KG (ref 275–295)
PLATELET # BLD AUTO: 214 10(3)UL (ref 150–450)
POTASSIUM SERPL-SCNC: 4 MMOL/L (ref 3.5–5.1)
PROT SERPL-MCNC: 7.4 G/DL (ref 5.7–8.2)
RBC # BLD AUTO: 4.16 X10(6)UL
SODIUM SERPL-SCNC: 142 MMOL/L (ref 136–145)
TIBC SERPL-MCNC: 446 UG/DL (ref 250–425)
TRANSFERRIN SERPL-MCNC: 299 MG/DL (ref 250–380)
TRIGL SERPL-MCNC: 55 MG/DL (ref 30–149)
TSI SER-ACNC: 0.93 UIU/ML (ref 0.55–4.78)
VLDLC SERPL CALC-MCNC: 10 MG/DL (ref 0–30)
WBC # BLD AUTO: 7.4 X10(3) UL (ref 4–11)

## 2024-11-12 PROCEDURE — 84443 ASSAY THYROID STIM HORMONE: CPT

## 2024-11-12 PROCEDURE — 83540 ASSAY OF IRON: CPT

## 2024-11-12 PROCEDURE — 90471 IMMUNIZATION ADMIN: CPT

## 2024-11-12 PROCEDURE — 82728 ASSAY OF FERRITIN: CPT

## 2024-11-12 PROCEDURE — 90656 IIV3 VACC NO PRSV 0.5 ML IM: CPT

## 2024-11-12 PROCEDURE — 3078F DIAST BP <80 MM HG: CPT

## 2024-11-12 PROCEDURE — 84466 ASSAY OF TRANSFERRIN: CPT

## 2024-11-12 PROCEDURE — 85025 COMPLETE CBC W/AUTO DIFF WBC: CPT

## 2024-11-12 PROCEDURE — 80053 COMPREHEN METABOLIC PANEL: CPT

## 2024-11-12 PROCEDURE — 99395 PREV VISIT EST AGE 18-39: CPT

## 2024-11-12 PROCEDURE — 36415 COLL VENOUS BLD VENIPUNCTURE: CPT

## 2024-11-12 PROCEDURE — 3008F BODY MASS INDEX DOCD: CPT

## 2024-11-12 PROCEDURE — 3074F SYST BP LT 130 MM HG: CPT

## 2024-11-12 PROCEDURE — 80061 LIPID PANEL: CPT

## 2024-11-12 RX ORDER — LAMOTRIGINE 100 MG/1
50 TABLET ORAL DAILY
COMMUNITY

## 2024-11-12 RX ORDER — MINOCYCLINE HYDROCHLORIDE 50 MG/1
CAPSULE ORAL
COMMUNITY
Start: 2024-07-26 | End: 2024-11-12 | Stop reason: ALTCHOICE

## 2024-11-12 RX ORDER — NORGESTIMATE AND ETHINYL ESTRADIOL 0.25-0.035
1 KIT ORAL DAILY
Qty: 84 TABLET | Refills: 3 | Status: SHIPPED | OUTPATIENT
Start: 2024-11-12 | End: 2025-11-12

## 2024-11-12 RX ORDER — ETONOGESTREL AND ETHINYL ESTRADIOL VAGINAL RING .015; .12 MG/D; MG/D
1 RING VAGINAL
COMMUNITY
End: 2024-11-12 | Stop reason: ALTCHOICE

## 2024-11-12 RX ORDER — SODIUM SULFACETAMIDE AND SULFUR 80; 40 MG/ML; MG/ML
SOLUTION TOPICAL
COMMUNITY
Start: 2024-09-27

## 2024-11-12 RX ORDER — LAMOTRIGINE 150 MG/1
TABLET ORAL
COMMUNITY
Start: 2024-09-12 | End: 2024-11-12 | Stop reason: ALTCHOICE

## 2024-11-12 NOTE — PROGRESS NOTES
HPI:    Patient ID: Larisa Sandoval is a 24 year old female.    HPI     Patient here in office for physical.  Last Pap completed in 2021 and was normal.  Reports regular menstrual cycles, takes oral contraceptives.  Possibly interested in IUD, requesting information.      Has history of anxiety/depression.  Takes alprazolam as needed and lamotrigine 100 mg, 0.5 tablet daily.  Denies suicidal/homicidal ideation.  Attends therapy twice monthly.    Blood pressure low at arrival B/P was 86/54.  Rechecked and B/P was 89/56.  Denies lightheadedness or dizziness.  Per patient, her blood pressure tends to run in the lower range.      Review of Systems   Constitutional: Negative.  Negative for fever.   HENT: Negative.  Negative for ear pain and sore throat.    Eyes: Negative.  Negative for visual disturbance.   Respiratory: Negative.  Negative for cough and shortness of breath.    Cardiovascular: Negative.  Negative for chest pain and palpitations.   Gastrointestinal: Negative.  Negative for abdominal pain, blood in stool, constipation and diarrhea.   Genitourinary: Negative.  Negative for dysuria, frequency, hematuria and menstrual problem.   Musculoskeletal: Negative.  Negative for arthralgias and myalgias.   Skin: Negative.  Negative for rash.   Neurological: Negative.  Negative for dizziness, facial asymmetry and headaches.   Psychiatric/Behavioral:  Positive for dysphoric mood. Negative for suicidal ideas. The patient is nervous/anxious.             Current Outpatient Medications   Medication Sig Dispense Refill    Sulfacetamide Sodium-Sulfur 8-4 % External Suspension USE TO WASH FACE, CHEST AND back ONCE TO TWICE A DAY AS tolerated      lamoTRIgine 100 MG Oral Tab Take 0.5 tablets (50 mg total) by mouth daily.      Norgestimate-Eth Estradiol (SPRINTEC 28) 0.25-35 MG-MCG Oral Tab Take 1 tablet by mouth daily. 84 tablet 3    ALPRAZolam 1 MG Oral Tab Take 1 tablet (1 mg total) by mouth nightly as needed. 10 tablet 0     spironolactone 50 MG Oral Tab Take 0.5 tablets (25 mg total) by mouth 2 (two) times daily.      Ferrous Sulfate 325 (65 Fe) MG Oral Tab Take 1 tablet (325 mg total) by mouth daily with breakfast. 30 tablet 0     Allergies:Allergies[1]   BP (!) 89/56   Pulse 75   Temp 97.5 °F (36.4 °C) (Temporal)   Resp 16   Ht 5' 3\" (1.6 m)   Wt 116 lb (52.6 kg)   LMP 10/31/2024 (Approximate)   SpO2 100%   BMI 20.55 kg/m²   Body mass index is 20.55 kg/m².  PHYSICAL EXAM:   Physical Exam  Vitals reviewed.   Constitutional:       General: She is not in acute distress.     Appearance: Normal appearance. She is well-developed. She is not ill-appearing.   HENT:      Head: Normocephalic and atraumatic.      Right Ear: Tympanic membrane, ear canal and external ear normal. There is no impacted cerumen.      Left Ear: Tympanic membrane, ear canal and external ear normal. There is no impacted cerumen.      Nose: Nose normal. No congestion.      Mouth/Throat:      Mouth: Mucous membranes are moist.      Pharynx: Oropharynx is clear. No oropharyngeal exudate or posterior oropharyngeal erythema.   Eyes:      General:         Right eye: No discharge.         Left eye: No discharge.      Extraocular Movements: Extraocular movements intact.      Conjunctiva/sclera: Conjunctivae normal.      Pupils: Pupils are equal, round, and reactive to light.   Cardiovascular:      Rate and Rhythm: Normal rate and regular rhythm.      Heart sounds: Normal heart sounds. No murmur heard.     No friction rub. No gallop.   Pulmonary:      Effort: Pulmonary effort is normal. No respiratory distress.      Breath sounds: Normal breath sounds. No stridor. No wheezing, rhonchi or rales.   Chest:      Chest wall: No tenderness.   Abdominal:      General: Bowel sounds are normal. There is no distension.      Palpations: Abdomen is soft. There is no mass.      Tenderness: There is no abdominal tenderness. There is no right CVA tenderness, left CVA tenderness,  guarding or rebound.      Hernia: No hernia is present.   Genitourinary:     General: Normal vulva.      Vagina: Vaginal discharge present.   Musculoskeletal:         General: No tenderness. Normal range of motion.      Cervical back: Normal range of motion and neck supple.   Lymphadenopathy:      Cervical: No cervical adenopathy.   Skin:     General: Skin is warm and dry.      Findings: No rash.   Neurological:      General: No focal deficit present.      Mental Status: She is alert and oriented to person, place, and time.      Cranial Nerves: No cranial nerve deficit.      Sensory: No sensory deficit.      Motor: No weakness.      Deep Tendon Reflexes: Reflexes are normal and symmetric.   Psychiatric:         Mood and Affect: Mood normal.         Behavior: Behavior normal.         Thought Content: Thought content normal.         Judgment: Judgment normal.                ASSESSMENT/PLAN:   1. Routine physical examination  - CBC With Differential With Platelet  - Comp Metabolic Panel (14)  - Lipid Panel  - TSH W Reflex To Free T4 [E]    2. Encounter for gynecological examination  - ThinPrep PAP with HPV Reflex Request [E]; Future  - Vaginitis Vaginosis PCR Panel    3. Anxiety and depression  -Continue present management    4. Hypotension, unspecified hypotension type  -Advised patient to drink 64 ounces of water daily  - Ferritin [E]  - Iron And Tibc [E]    5. General counseling and advice for contraceptive management  -Pamphlets given on Mirena/Kyleena IUD      Orders Placed This Encounter   Procedures    CBC With Differential With Platelet    Comp Metabolic Panel (14)    Lipid Panel    TSH W Reflex To Free T4 [E]    Ferritin [E]    Iron And Tibc [E]    Hpv Dna  High Risk , Thin Prep Collect    Fluzone trivalent vaccine, PF 0.5mL, 6mo+ (50327)    ThinPrep PAP with HPV Reflex Request [E]    ThinPrep PAP with HPV Reflex Request    Vaginitis Vaginosis PCR Panel       Meds This Visit:  Requested Prescriptions      Signed Prescriptions Disp Refills    Norgestimate-Eth Estradiol (SPRINTEC 28) 0.25-35 MG-MCG Oral Tab 84 tablet 3     Sig: Take 1 tablet by mouth daily.       Imaging & Referrals:  INFLUENZA VACCINE, TRI, PRESERV FREE, 0.5 ML         ID#[id number]         [1] No Known Allergies

## 2024-11-13 LAB
BV BACTERIA DNA VAG QL NAA+PROBE: NEGATIVE
C GLABRATA DNA VAG QL NAA+PROBE: NEGATIVE
C KRUSEI DNA VAG QL NAA+PROBE: NEGATIVE
CANDIDA DNA VAG QL NAA+PROBE: NEGATIVE
T VAGINALIS DNA VAG QL NAA+PROBE: NEGATIVE

## 2024-11-14 LAB — HPV E6+E7 MRNA CVX QL NAA+PROBE: NEGATIVE

## 2025-08-22 ENCOUNTER — APPOINTMENT (OUTPATIENT)
Age: 25
End: 2025-08-22

## (undated) NOTE — LETTER
8/4/2020          To Whom It May Concern:    Magn[facility] Moon is currently under my medical care. Due to concerns for my patient experiencing complications if she contracts COVID-19, please excuse her for living on campus. If you require additional information please contact our office.         Sincerely,      Stefan Carter MD          Document generated by:  Stefan Carter

## (undated) NOTE — MR AVS SNAPSHOT
After Visit Summary   6/15/2021    Avi Gardner    MRN: [id number]           Visit Information     Date & Time  6/15/2021  1:00 PM Provider  Kendra Redmond, [address], [facility] Dept.  Phone  [phone] fever, rash, sore throat, headache and pink eye. The cost for a Video Visit is currently $35.         If you receive a survey from [employer], please take a few minutes to complete it and provide feedback.  We strive to deliver the best patient experience non-life-threatening. Also available by appointment Average cost  $120*     EMERGENCY ROOM Life-threatening emergencies needing immediate intervention at a hospital emergency room.  Average cost  $2,300*   *Cost varies based on your insurance coverage  F

## (undated) NOTE — LETTER
AUTHORIZATION FOR SURGICAL OPERATION OR OTHER PROCEDURE    1.  I hereby authorize Dr. Meghan Martinez, [facility] assigned to my case to perform the following operation and/or procedure at the [facility]:    ______________________ Time:  ________ A. M.  P.M.        Patient Name:  ______________________________________________________  (please print)      Patient signature:  ___________________________________________________             Relationship to Patient: